# Patient Record
Sex: FEMALE | Race: WHITE | Employment: OTHER | URBAN - METROPOLITAN AREA
[De-identification: names, ages, dates, MRNs, and addresses within clinical notes are randomized per-mention and may not be internally consistent; named-entity substitution may affect disease eponyms.]

---

## 2021-02-20 ENCOUNTER — HOSPITAL ENCOUNTER (EMERGENCY)
Facility: CLINIC | Age: 71
Discharge: HOME | End: 2021-02-20
Attending: PHYSICIAN ASSISTANT | Admitting: PHYSICIAN ASSISTANT
Payer: COMMERCIAL

## 2021-02-20 ENCOUNTER — APPOINTMENT (OUTPATIENT)
Dept: GENERAL RADIOLOGY | Facility: CLINIC | Age: 71
End: 2021-02-20
Attending: PHYSICIAN ASSISTANT
Payer: COMMERCIAL

## 2021-02-20 VITALS
TEMPERATURE: 97.9 F | SYSTOLIC BLOOD PRESSURE: 145 MMHG | OXYGEN SATURATION: 98 % | HEART RATE: 75 BPM | DIASTOLIC BLOOD PRESSURE: 68 MMHG | RESPIRATION RATE: 18 BRPM

## 2021-02-20 DIAGNOSIS — S42.401A ELBOW FRACTURE, RIGHT: ICD-10-CM

## 2021-02-20 DIAGNOSIS — M25.521 RIGHT ELBOW PAIN: ICD-10-CM

## 2021-02-20 DIAGNOSIS — S93.409A ANKLE SPRAIN: ICD-10-CM
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
## 2021-02-20 PROCEDURE — 73080 X-RAY EXAM OF ELBOW: CPT | Mod: RT

## 2021-02-20 PROCEDURE — 29105 APPLICATION LONG ARM SPLINT: CPT | Mod: RT | Performed by: PHYSICIAN ASSISTANT

## 2021-02-20 PROCEDURE — 99284 EMERGENCY DEPT VISIT MOD MDM: CPT | Mod: 25 | Performed by: PHYSICIAN ASSISTANT

## 2021-02-20 PROCEDURE — 24576 CLTX HUMRL CNDYLR FX WO MNPJ: CPT | Mod: RT | Performed by: PHYSICIAN ASSISTANT

## 2021-02-20 PROCEDURE — 24576 CLTX HUMRL CNDYLR FX WO MNPJ: CPT | Mod: 54 | Performed by: PHYSICIAN ASSISTANT

## 2021-02-20 PROCEDURE — 73610 X-RAY EXAM OF ANKLE: CPT | Mod: RT

## 2021-02-20 RX ORDER — HYDROCODONE BITARTRATE AND ACETAMINOPHEN 5; 325 MG/1; MG/1
.5-1 TABLET ORAL EVERY 6 HOURS PRN
Qty: 10 TABLET | Refills: 0 | Status: SHIPPED | OUTPATIENT
Start: 2021-02-20 | End: 2021-02-23

## 2021-02-20 NOTE — ED NOTES
"Pt refused commode stating 'I can walk to the bathroom.\" > writer educated patient on the reasoning for a commode > pt again stated \"No, I can go to the bathroom.\"    "

## 2021-02-20 NOTE — DISCHARGE INSTRUCTIONS
It was a pleasure working with you today!  I hope your condition improves rapidly!     I am concerned that you likely had a dislocated elbow and it went back into place on its own like you described.  It appears that there is a small bone chip fracture that may have occurred due to this process.  The ankle joint did not show any sign of fracture, and this thankfully is just a sprain.    Please REST your arm :)  Ice the painful area for 15-20 minutes every couple hours.  Use the splint at all times other than when you are doing active range of motion exercises like we discussed 2-3 times per day.  The concept is that if it hurts, do not do it.  I would not do any lifting with your right arm until released to do so.  You can wrap your ankle to provide support for the ankle sprain.  You can use ibuprofen 600 mg every 6 hours as needed for pain.  Use the hydrocodone for breakthrough pain.  Do not drive for 8 hours after taking hydrocodone, as it can impair your judgment.

## 2021-02-20 NOTE — ED PROVIDER NOTES
History     Chief Complaint   Patient presents with     Fall     Arm Injury     HPI  Oneyda Chase is a 70 year old female who presents for evaluation of a fall that occurred just prior to arrival.  She was brought into the ED by EMS.  She reports that she was climbing stairs at a local store and tripped.  She fell onto her right elbow and felt a pop and twist to her right ankle.  Reports that she is able to bear weight on the ankle, but has noticed swelling.  Pain was quite significant right away.  EMS did give her IM ketamine which helped her pain.  Pain is currently rated 5 on a scale of 10.  She does not feel she needs anything extra for the pain currently.  Range of motion and pressure over the painful area causes increased pain.  Denies any prior problems with her right ankle or right upper extremity.  She denies any pain into the forearm, wrist, hand, upper arm, shoulder, or neck.  Denies any chest or abdominal trauma.  No LOC.  She did suffer a mild abrasion on her nose, but states that she does not have any facial pain at all.  No diplopia or blurry vision.        Allergies:  No Known Allergies    Problem List:    There are no active problems to display for this patient.       Past Medical History:    History reviewed. No pertinent past medical history.    Past Surgical History:    History reviewed. No pertinent surgical history.    Family History:    History reviewed. No pertinent family history.    Social History:  Marital Status:    Social History     Tobacco Use     Smoking status: Former Smoker     Smokeless tobacco: Never Used   Substance Use Topics     Alcohol use: Not Currently     Drug use: Not Currently        Medications:    HYDROcodone-acetaminophen (NORCO) 5-325 MG tablet          Review of Systems   All other systems reviewed and are negative.      Physical Exam   BP: (!) 151/69  Pulse: 74  Temp: 97.9  F (36.6  C)  Resp: 18  SpO2: 99 %      Physical Exam  Vitals signs and nursing note  reviewed.   Constitutional:       General: She is not in acute distress.     Appearance: She is not diaphoretic.   HENT:      Head: Normocephalic and atraumatic.      Comments: Negative young sign.  No tenderness to palpation throughout the entire scalp.  No laceration.     Right Ear: External ear normal.      Left Ear: External ear normal.      Nose:      Comments: Mild abrasion on the left side of the nose but no sign of deformity or swelling.  No bruising.  No septal deviation or septal hematoma.     Mouth/Throat:      Mouth: Mucous membranes are moist.      Pharynx: No oropharyngeal exudate.   Eyes:      General: No scleral icterus.        Right eye: No discharge.         Left eye: No discharge.      Conjunctiva/sclera: Conjunctivae normal.      Pupils: Pupils are equal, round, and reactive to light.   Neck:      Musculoskeletal: Normal range of motion and neck supple.      Thyroid: No thyromegaly.   Cardiovascular:      Rate and Rhythm: Normal rate and regular rhythm.      Heart sounds: Normal heart sounds. No murmur.   Pulmonary:      Effort: Pulmonary effort is normal. No respiratory distress.      Breath sounds: Normal breath sounds. No wheezing or rales.   Chest:      Chest wall: No tenderness.   Abdominal:      General: Bowel sounds are normal. There is no distension.      Palpations: Abdomen is soft. There is no mass.      Tenderness: There is no abdominal tenderness. There is no guarding or rebound.   Musculoskeletal:         General: No deformity.      Right shoulder: Normal. She exhibits normal range of motion, no tenderness, no bony tenderness, no swelling, no effusion, no deformity, no laceration, no pain and no spasm.      Right elbow: She exhibits decreased range of motion and swelling. She exhibits no effusion, no deformity and no laceration. Tenderness found. Radial head and lateral epicondyle tenderness noted. No medial epicondyle and no olecranon process tenderness noted.      Right wrist:  Normal. She exhibits normal range of motion, no tenderness, no bony tenderness, no swelling, no effusion, no crepitus, no deformity and no laceration.      Right hip: Normal. She exhibits normal range of motion, normal strength, no tenderness and no bony tenderness.      Left hip: Normal. She exhibits normal range of motion, normal strength, no tenderness, no bony tenderness and no swelling.      Right ankle: She exhibits decreased range of motion and swelling (lateral malleolus). She exhibits no ecchymosis, no deformity and no laceration. Tenderness. Lateral malleolus tenderness found. No medial malleolus, no head of 5th metatarsal and no proximal fibula tenderness found. Achilles tendon exhibits no pain, no defect and normal Pruett's test results.      Cervical back: Normal. She exhibits normal range of motion, no tenderness, no bony tenderness, no swelling, no edema, no deformity, no laceration, no pain and no spasm.      Thoracic back: Normal. She exhibits normal range of motion, no tenderness, no bony tenderness and no swelling.      Lumbar back: Normal. She exhibits normal range of motion, no tenderness, no bony tenderness, no swelling and no edema.      Right upper arm: Normal. She exhibits no tenderness, no bony tenderness, no swelling, no edema and no deformity.      Right forearm: Normal. She exhibits no tenderness, no bony tenderness, no swelling, no edema, no deformity and no laceration.      Right hand: Normal. She exhibits normal range of motion, no tenderness, no bony tenderness, normal capillary refill, no deformity and no laceration.      Right lower leg: Normal. She exhibits no tenderness, no bony tenderness, no swelling and no deformity.      Right foot: Normal. Normal range of motion and normal capillary refill. No tenderness, bony tenderness or swelling.   Lymphadenopathy:      Cervical: No cervical adenopathy.   Skin:     General: Skin is warm and dry.      Capillary Refill: Capillary refill  takes less than 2 seconds.      Findings: No erythema or rash.   Neurological:      Mental Status: She is alert and oriented to person, place, and time.      Cranial Nerves: No cranial nerve deficit.   Psychiatric:         Behavior: Behavior normal.         Thought Content: Thought content normal.         ED Course        Procedures               Critical Care time:  none               Results for orders placed or performed during the hospital encounter of 02/20/21 (from the past 24 hour(s))   XR Elbow Right G/E 3 Views    Narrative    EXAM: XR ELBOW RT G/E 3 VIEW  LOCATION: [facility]  DATE/TIME: 02/20/2021, 2:24 PM    INDICATION: Fall, lateral elbow pain.  COMPARISON: None.      Impression    IMPRESSION: Small calcification adjacent to the lateral epicondyle, age indeterminate. This could represent a subtle avulsion injury. The radiocapitellar joint may be mildly widened as well. No apparent abnormal fat pad sign. No other evidence of acute   fracture.     XR Ankle Right 3 Views    Narrative    EXAM: XR ANKLE RT G/E 3 VW  LOCATION: [facility]  DATE/TIME: 2/20/2021 2:24 PM    INDICATION: Fall, lateral malleolar pain.  COMPARISON: None.      Impression    IMPRESSION: Lateral soft tissue swelling. Ligament injury is suspected. No apparent fracture. The ankle mortise appears congruent. Small plantar calcaneal spur.       Medications - No data to display    Assessments & Plan (with Medical Decision Making)     Fall  Elbow fracture, right  Right elbow pain  Ankle sprain     70 year old female presents for evaluation of a fall that occurred just prior to arrival when she tripped going up the stairs.  She fell onto her right elbow and twisted her right ankle.  Immediately after the injury she had severe pain but then she felt a clunk and her pain improved.  An abrasion on the left nose, but she denies any facial discomfort.  No LOC and no head/neck injury.  Denies any other pain.  Given  ketamine IM by EMS.  Declines need for pain medication here.  Pain currently rated 5 on a scale of 10.  On exam blood pressure 151/69, temperature 97.9, pulse 74, respiration 18, oxygen saturation 99% on room air.  Patient appears in no acute distress.  There is no obvious deformity on inspection of her extremities.  She does have significant lateral elbow tenderness to palpation and pain with any range of motion.  Distal pulses intact.  No tenderness throughout the remainder the upper extremity.  No head neck abnormalities.  Right lateral malleolus with swelling and tenderness but no deformity.  Remainder the exam is otherwise reassuring.  Offered pain medication right away after my evaluation, but the patient declined.  X-rays display no acute dislocation of the elbow but she does have a small calcification noticed on the lateral view adjacent to the lateral epicondyles that could represent a subtle avulsion injury.  No fat pad sign.  No other acute fracture.  X-ray of the ankle was negative for acute fracture.    Based on history, patient likely had a dislocation of the elbow that relocated itself.  She had very severe pain which rather suddenly improved upon feeling movement and a clunk in the elbow.  This is may be why there is a subtle avulsion injury present.  Therefore, we placed her in a posterior Ortho-Glass splint with intact distal CMS afterwards.  Sling provided.  Encouraged her to keep this on at all times other than trying some gentle range of motion exercise after 2-3 days of immobilization.  We want to avoid chronic contracture of the elbow joint.  Ortho  referral placed for follow-up in 5-6 days.  ED return instructions reviewed.  I offered her an air stirrup brace for her ankle.  She declined.  She did let me wrap her ankle with an Ace bandage at a minimum for support.  Rest, ice, compression, and elevation discussed.  Ibuprofen 600 mg every 6 hours as needed for pain.  I did give her 10  tablets of hydrocodone to use for breakthrough pain.  Reserve this primarily for sleep if all possible.  No driving for 8 hours after taking this medication as it does impair judgment.  Patient was in agreement with this plan and was suitable for discharge.     I have reviewed the nursing notes.    I have reviewed the findings, diagnosis, plan and need for follow up with the patient.       Discharge Medication List as of 2/20/2021  4:19 PM      START taking these medications    Details   HYDROcodone-acetaminophen (NORCO) 5-325 MG tablet Take 0.5-1 tablets by mouth every 6 hours as needed for severe pain, Disp-10 tablet, R-0, E-Prescribe             Final diagnoses:   Fall   Elbow fracture, right   Right elbow pain - possible elbow dislocation that relocated on its own   Ankle sprain     Disclaimer: This note consists of symbols derived from keyboarding, dictation and/or voice recognition software. As a result, there may be errors in the script that have gone undetected. Please consider this when interpreting information found in this chart.        2/20/2021   [facility] EMERGENCY DEPT     Mason Valadez PA-C  02/20/21 8290

## 2021-02-20 NOTE — ED NOTES
Pt has a small abrasion to the left nostril, denies pain to the face or nose, ice pack was applied to the right elbow

## 2021-02-20 NOTE — ED TRIAGE NOTES
Pt fell at the store face first hitting her forehead, nose, and right elbow, no LOC and patient is a/o x4

## 2021-02-21 ENCOUNTER — HEALTH MAINTENANCE LETTER (OUTPATIENT)
Age: 71
End: 2021-02-21

## 2021-03-01 NOTE — PROGRESS NOTES
Sports Medicine Clinic Visit    PCP: No primary care provider on file.    CC: Patient presents with:  Right Elbow - Pain  Right Ankle - Pain  Left Shoulder - Pain  Left Knee - Pain      HPI:  Oneyda Chase is a 70 year old female who is seen as a self referral.  On 2/20/2021, she was walking and her right ankle inverted and she fell forward on the sidewalk.  Ambulance was called.  Noted right elbow and right ankle injuries at that time. X rays done by ER.    Right elbow:  She notes pain over the medial elbow. She notes that paramedics saw elbow reduce when they got there. She notes that x-ray showed a possible bone chip. Was placed in removable brace and has been out of the brace for 5 days due to it not fitting anymore from swelling decreasing. Noticeable pain and swelling on medial elbow. She rates the pain at a  6/10 at its worst and a 4/10 currently. Has been taking some Tylenol.     Right ankle:  She notes pain over both lateral malleoli. She rates the pain at a  8/10 at its worst and a 4/10 currently. Swelling noted over lateral malleolus and some bruising.  ER put her in a wrap for compression. Has not been wearing wrap for past 5 days. No other treatment for ankle to date. X-rays done by ER and no fractures noted.    Left knee:  She notes left knee pain when she fell onto the anterior knee during fall on 2/20/2021. She notes pain over the lateral knee and inferior to the patella. Also notes some numbness along the joint line of lateral knee. She rates the pain at a  4/10 at its worst and a 2/10 currently.  No treatment to date for the knee. Noticeable swelling and bruising over front of knee. Only painful to palpation. No popping or grinding in knee. No images done to date for knee.    Left shoulder:  Left shoulder pain for past few years. Pain is over anterior shoulder. She notes a shoulder injury over 20 years ago when her fell and the arm rest of an armchair went into her armpit. She notes her shoulder  "\"freezes\" and is painful. ROM is only decreased when shoulder \"freezes\" and she cannot move it at all. Told patient we may not be able to address this today since it is from another injury date.    She works in adult foster care.    History reviewed. No pertinent past surgical/medical/family/social history other than as mentioned in HPI.  Review of systems negative except per HPI.      Patient Active Problem List   Diagnosis     CARDIOVASCULAR SCREENING; LDL GOAL LESS THAN 160     GERD (gastroesophageal reflux disease)     Past Medical History:   Diagnosis Date     Abdominal pain, epigastric      Calculus of gallbladder without mention of cholecystitis or obstruction 10/31/00    Admit. Discharged 00     Past Surgical History:   Procedure Laterality Date     COLONOSCOPY  2012    [facility]      UGI ENDOSCOPY DIAG W BIOPSY  00    [facility]     Family History   Problem Relation Age of Onset     Diabetes Sister      Social History     Socioeconomic History     Marital status: Single     Spouse name: Not on file     Number of children: Not on file     Years of education: Not on file     Highest education level: Not on file   Occupational History     Not on file   Social Needs     Financial resource strain: Not on file     Food insecurity     Worry: Not on file     Inability: Not on file     Transportation needs     Medical: Not on file     Non-medical: Not on file   Tobacco Use     Smoking status: Former Smoker     Quit date: 2010     Years since quittin.0     Smokeless tobacco: Never Used   Substance and Sexual Activity     Alcohol use: Yes     Comment: on occasion     Drug use: No     Sexual activity: Never   Lifestyle     Physical activity     Days per week: Not on file     Minutes per session: Not on file     Stress: Not on file   Relationships     Social connections     Talks on phone: Not on file     Gets together: Not on file     Attends Christianity service: Not on " file     Active member of club or organization: Not on file     Attends meetings of clubs or organizations: Not on file     Relationship status: Not on file     Intimate partner violence     Fear of current or ex partner: Not on file     Emotionally abused: Not on file     Physically abused: Not on file     Forced sexual activity: Not on file   Other Topics Concern     Parent/sibling w/ CABG, MI or angioplasty before 65F 55M? Not Asked   Social History Narrative     Not on file         Current Outpatient Medications   Medication     ciprofloxacin (CIPRO) 500 MG tablet     estradiol (ESTRACE) 0.1 MG/GM vaginal cream     PREVACID OR     No current facility-administered medications for this visit.      No Known Allergies      Objective:  /68 (BP Location: Right arm, Patient Position: Sitting, Cuff Size: Adult Regular)   Wt 118.8 kg (262 lb)   BMI 42.29 kg/m      General: Alert and in no distress    Head: Normocephalic, atraumatic  Eyes: no scleral icterus or conjunctival erythema   Skin: no erythema, petechiae, or jaundice  CV: regular rhythm by palpation, 2+ distal pulses  Resp: normal respiratory effort without conversational dyspnea   Psych: normal mood and affect    Gait:  Mild limp    Musculoskeletal:  -Right elbow bruising and diffuse tenderness.  -Minimal decreased right elbow flexion and extension.  Full right forearm supination and pronation.    Bilateral Knee and Foot/Ankle Exam:    Inspection:  -Left knee bruising.  Small right knee bruise.   -Right lateral ankle swelling.      Palpation:  -Tenderness diffusely bilateral knees  -Tenderness right ATFL and lateral malleolus    ROM:        Full seated bilateral knee flexion and extension.  Full active ROM with ankle dorsiflexion, plantarflexion, inversion, eversion, great toe dorsiflexion, and great toe plantarflexion    Strength:       hip flexion/abduction/adduction 5/5 bilaterally       Kkee flexion/extension 5/5 bilaterally       ankle dorsiflexion  5/5 bilaterally       plantarflexion 5/5 bilaterally       inversion 5/5 bilaterally       eversion 5/5 bilaterally       great toe dorsiflexion 5/5 bilaterally       great toe plantarflexion 5/5 bilaterally    Neurovascular:       2+ peripheral pulses bilaterally       -Altered sensation to light touch left anterior knee      Radiology:  Independent visualization of images performed.  Possible subtle lateral avulsion fracture.  Lateral ankle soft tissue swelling.    EXAM: XR ELBOW RT G/E 3 VIEW  LOCATION: [facility]  DATE/TIME: 02/20/2021, 2:24 PM     INDICATION: Fall, lateral elbow pain.  COMPARISON: None.                                                                      IMPRESSION: Small calcification adjacent to the lateral epicondyle, age indeterminate. This could represent a subtle avulsion injury. The radiocapitellar joint may be mildly widened as well. No apparent abnormal fat pad sign. No other evidence of acute   Fracture.    EXAM: XR ANKLE RT G/E 3 VW  LOCATION: [facility]  DATE/TIME: 2/20/2021 2:24 PM     INDICATION: Fall, lateral malleolar pain.  COMPARISON: None.                                                                      IMPRESSION: Lateral soft tissue swelling. Ligament injury is suspected. No apparent fracture. The ankle mortise appears congruent. Small plantar calcaneal spur.    Assessment:  1. Sprain of anterior talofibular ligament of right ankle, initial encounter    2. Injury of right elbow, initial encounter    3. Injury of left knee, initial encounter        Plan:  Discussed the assessment with the patient and developed a plan together:    Right ankle:  -Patient's preferred over the counter medication as directed on packaging as needed for pain or soreness.  -Ice 15-20 minutes for pain relief or swelling as needed.  -Work on range of motion of the ankle.  -Elevate the ankle above the heart as much as possible to reduce swelling.  -Compression as needed  for ankle swelling.    -Ankle brace as needed for comfort and support.      Right elbow:  -Can discontinue elbow brace.   -Work on range of motion of the ankle.  -Patient's preferred over the counter medication as directed on packaging as needed for pain or soreness.  -Ice 15-20 minutes for pain relief or swelling as needed.    Left knee:  -Suspect contusion.  Discussed x-rays, she declined.  -Patient's preferred over the counter medication as directed on packaging as needed for pain or soreness.  -Ice 15-20 minutes for pain relief or swelling as needed    Left shoulder:  -Not from the same injury.  Did not address today due to time.      -Follow up as needed if symptoms fail to improve or worsen.  Please call with questions or concerns.        Farzana Burris MD, CAQ Sports Medicine  [facility]

## 2021-03-02 ENCOUNTER — OFFICE VISIT (OUTPATIENT)
Dept: ORTHOPEDICS | Facility: CLINIC | Age: 71
End: 2021-03-02
Payer: COMMERCIAL

## 2021-03-02 VITALS — DIASTOLIC BLOOD PRESSURE: 68 MMHG | BODY MASS INDEX: 42.29 KG/M2 | SYSTOLIC BLOOD PRESSURE: 120 MMHG | WEIGHT: 262 LBS

## 2021-03-02 DIAGNOSIS — S59.901A INJURY OF RIGHT ELBOW, INITIAL ENCOUNTER: ICD-10-CM

## 2021-03-02 DIAGNOSIS — S93.491A SPRAIN OF ANTERIOR TALOFIBULAR LIGAMENT OF RIGHT ANKLE, INITIAL ENCOUNTER: Primary | ICD-10-CM

## 2021-03-02 DIAGNOSIS — S89.92XA INJURY OF LEFT KNEE, INITIAL ENCOUNTER: ICD-10-CM

## 2021-03-02 PROCEDURE — 99204 OFFICE O/P NEW MOD 45 MIN: CPT | Performed by: PHYSICAL MEDICINE & REHABILITATION

## 2021-03-02 NOTE — PATIENT INSTRUCTIONS
-Patient's preferred over the counter medication as directed on packaging as needed for pain or soreness.  -Ice 15-20 minutes for pain relief or swelling as needed.  -Can discontinue elbow brace.    -Work on range of motion of the elbow and ankle.  -Elevate the ankle above the heart as much as possible to reduce swelling.  -Compression as needed for ankle swelling.    -Ankle brace as needed for comfort and support.      -Follow up as needed if symptoms fail to improve or worsen.  Please call with questions or concerns.

## 2021-10-23 ENCOUNTER — HEALTH MAINTENANCE LETTER (OUTPATIENT)
Age: 71
End: 2021-10-23

## 2021-12-11 ENCOUNTER — HOSPITAL ENCOUNTER (EMERGENCY)
Facility: CLINIC | Age: 71
Discharge: HOME | End: 2021-12-11
Payer: COMMERCIAL

## 2021-12-11 VITALS
HEART RATE: 81 BPM | RESPIRATION RATE: 20 BRPM | TEMPERATURE: 98.9 F | BODY MASS INDEX: 39.82 KG/M2 | SYSTOLIC BLOOD PRESSURE: 132 MMHG | OXYGEN SATURATION: 93 % | DIASTOLIC BLOOD PRESSURE: 66 MMHG | WEIGHT: 246.7 LBS

## 2021-12-11 NOTE — ED TRIAGE NOTES
Pt has had 3 weeks of covid symptoms, she was at the clinic today and sent here for shortness of breath and her low oxygen level

## 2022-04-09 ENCOUNTER — HEALTH MAINTENANCE LETTER (OUTPATIENT)
Age: 72
End: 2022-04-09

## 2022-10-09 ENCOUNTER — HEALTH MAINTENANCE LETTER (OUTPATIENT)
Age: 72
End: 2022-10-09

## 2023-03-25 ENCOUNTER — HEALTH MAINTENANCE LETTER (OUTPATIENT)
Age: 73
End: 2023-03-25

## 2023-05-21 ENCOUNTER — HEALTH MAINTENANCE LETTER (OUTPATIENT)
Age: 73
End: 2023-05-21

## 2024-05-25 ENCOUNTER — HEALTH MAINTENANCE LETTER (OUTPATIENT)
Age: 74
End: 2024-05-25

## 2025-01-31 NOTE — Clinical Note
3/2/2021         RE: Oneyda Chase  [address] [facility] [address]        Dear Colleague,    Thank you for referring your patient, Oneyda Chase, to the [facility] [place]. Please see a copy of my visit note below.    Sports Medicine Clinic Visit    PCP: No primary care provider on file.    CC: Patient presents with:  Right Elbow - Pain  Right Ankle - Pain  Left Shoulder - Pain  Left Knee - Pain      HPI:  Oneyda Chase is a 70 year old female who is seen as a self referral.  On 2/20/2021, she was walking and her right ankle inverted and she fell forward on the sidewalk.  Ambulance was called.  Noted right elbow and right ankle injuries at that time. X rays done by ER.    Right elbow:  She notes pain over the medial elbow. She notes that paramedics saw elbow reduce when they got there. She notes that x-ray showed a possible bone chip. Was placed in removable brace and has been out of the brace for 5 days due to it not fitting anymore from swelling decreasing. Noticeable pain and swelling on medial elbow. She rates the pain at a  6/10 at its worst and a 4/10 currently. Has been taking some Tylenol.     Right ankle:  She notes pain over both lateral malleoli. She rates the pain at a  8/10 at its worst and a 4/10 currently. Swelling noted over lateral malleolus and some bruising.  ER put her in a wrap for compression. Has not been wearing wrap for past 5 days. No other treatment for ankle to date. X-rays done by ER and no fractures noted.    Left knee:  She notes left knee pain when she fell onto the anterior knee during fall on 2/20/2021. She notes pain over the lateral knee and inferior to the patella. Also notes some numbness along the joint line of lateral knee. She rates the pain at a  4/10 at its worst and a 2/10 currently.  No treatment to date for the knee. Noticeable swelling and bruising over front of knee. Only painful to palpation. No popping or grinding in  "knee. No images done to date for knee.    Left shoulder:  Left shoulder pain for past few years. Pain is over anterior shoulder. She notes a shoulder injury over 20 years ago when her fell and the arm rest of an armchair went into her armpit. She notes her shoulder \"freezes\" and is painful. ROM is only decreased when shoulder \"freezes\" and she cannot move it at all. Told patient we may not be able to address this today since it is from another injury date.    She works in adult foster care.    History reviewed. No pertinent past surgical/medical/family/social history other than as mentioned in HPI.  Review of systems negative except per HPI.      Patient Active Problem List   Diagnosis     CARDIOVASCULAR SCREENING; LDL GOAL LESS THAN 160     GERD (gastroesophageal reflux disease)     Past Medical History:   Diagnosis Date     Abdominal pain, epigastric      Calculus of gallbladder without mention of cholecystitis or obstruction 10/31/00    Admit. Discharged 00     Past Surgical History:   Procedure Laterality Date     COLONOSCOPY  2012    [facility]      UGI ENDOSCOPY DIAG W BIOPSY  00    [facility]     Family History   Problem Relation Age of Onset     Diabetes Sister      Social History     Socioeconomic History     Marital status: Single     Spouse name: Not on file     Number of children: Not on file     Years of education: Not on file     Highest education level: Not on file   Occupational History     Not on file   Social Needs     Financial resource strain: Not on file     Food insecurity     Worry: Not on file     Inability: Not on file     Transportation needs     Medical: Not on file     Non-medical: Not on file   Tobacco Use     Smoking status: Former Smoker     Quit date: 2010     Years since quittin.0     Smokeless tobacco: Never Used   Substance and Sexual Activity     Alcohol use: Yes     Comment: on occasion     Drug use: No     Sexual activity: Never "   Lifestyle     Physical activity     Days per week: Not on file     Minutes per session: Not on file     Stress: Not on file   Relationships     Social connections     Talks on phone: Not on file     Gets together: Not on file     Attends [employer] service: Not on file     Active member of club or organization: Not on file     Attends meetings of clubs or organizations: Not on file     Relationship status: Not on file     Intimate partner violence     Fear of current or ex partner: Not on file     Emotionally abused: Not on file     Physically abused: Not on file     Forced sexual activity: Not on file   Other Topics Concern     Parent/sibling w/ CABG, MI or angioplasty before 65F 55M? Not Asked   Social History Narrative     Not on file         Current Outpatient Medications   Medication     ciprofloxacin (CIPRO) 500 MG tablet     estradiol (ESTRACE) 0.1 MG/GM vaginal cream     PREVACID OR     No current facility-administered medications for this visit.      No Known Allergies      Objective:  /68 (BP Location: Right arm, Patient Position: Sitting, Cuff Size: Adult Regular)   Wt 118.8 kg (262 lb)   BMI 42.29 kg/m      General: Alert and in no distress    Head: Normocephalic, atraumatic  Eyes: no scleral icterus or conjunctival erythema   Skin: no erythema, petechiae, or jaundice  CV: regular rhythm by palpation, 2+ distal pulses  Resp: normal respiratory effort without conversational dyspnea   Psych: normal mood and affect    Gait:  Mild limp    Musculoskeletal:  -Right elbow bruising and diffuse tenderness.  -Minimal decreased right elbow flexion and extension.  Full right forearm supination and pronation.    Bilateral Knee and Foot/Ankle Exam:    Inspection:  -Left knee bruising.  Small right knee bruise.   -Right lateral ankle swelling.      Palpation:  -Tenderness diffusely bilateral knees  -Tenderness right ATFL and lateral malleolus    ROM:        Full seated bilateral knee flexion and extension.   Full active ROM with ankle dorsiflexion, plantarflexion, inversion, eversion, great toe dorsiflexion, and great toe plantarflexion    Strength:       hip flexion/abduction/adduction 5/5 bilaterally       Kkee flexion/extension 5/5 bilaterally       ankle dorsiflexion 5/5 bilaterally       plantarflexion 5/5 bilaterally       inversion 5/5 bilaterally       eversion 5/5 bilaterally       great toe dorsiflexion 5/5 bilaterally       great toe plantarflexion 5/5 bilaterally    Neurovascular:       2+ peripheral pulses bilaterally       -Altered sensation to light touch left anterior knee      Radiology:  Independent visualization of images performed.  Possible subtle lateral avulsion fracture.  Lateral ankle soft tissue swelling.    EXAM: XR ELBOW RT G/E 3 VIEW  LOCATION: [facility]  DATE/TIME: 02/20/2021, 2:24 PM     INDICATION: Fall, lateral elbow pain.  COMPARISON: None.                                                                      IMPRESSION: Small calcification adjacent to the lateral epicondyle, age indeterminate. This could represent a subtle avulsion injury. The radiocapitellar joint may be mildly widened as well. No apparent abnormal fat pad sign. No other evidence of acute   Fracture.    EXAM: XR ANKLE RT G/E 3 VW  LOCATION: [facility]  DATE/TIME: 2/20/2021 2:24 PM     INDICATION: Fall, lateral malleolar pain.  COMPARISON: None.                                                                      IMPRESSION: Lateral soft tissue swelling. Ligament injury is suspected. No apparent fracture. The ankle mortise appears congruent. Small plantar calcaneal spur.    Assessment:  1. Sprain of anterior talofibular ligament of right ankle, initial encounter    2. Injury of right elbow, initial encounter    3. Injury of left knee, initial encounter        Plan:  Discussed the assessment with the patient and developed a plan together:    Right ankle:  -Patient's preferred over the counter  medication as directed on packaging as needed for pain or soreness.  -Ice 15-20 minutes for pain relief or swelling as needed.  -Work on range of motion of the ankle.  -Elevate the ankle above the heart as much as possible to reduce swelling.  -Compression as needed for ankle swelling.    -Ankle brace as needed for comfort and support.      Right elbow:  -Can discontinue elbow brace.   -Work on range of motion of the ankle.  -Patient's preferred over the counter medication as directed on packaging as needed for pain or soreness.  -Ice 15-20 minutes for pain relief or swelling as needed.    Left knee:  -Suspect contusion.  Discussed x-rays, she declined.  -Patient's preferred over the counter medication as directed on packaging as needed for pain or soreness.  -Ice 15-20 minutes for pain relief or swelling as needed    Left shoulder:  -Not from the same injury.  Did not address today due to time.      -Follow up as needed if symptoms fail to improve or worsen.  Please call with questions or concerns.        Farzana Burris MD, [facility]  [facility]      Again, thank you for allowing me to participate in the care of your patient.        Sincerely,        Lynsey Burris MD   No